# Patient Record
Sex: FEMALE | Race: WHITE
[De-identification: names, ages, dates, MRNs, and addresses within clinical notes are randomized per-mention and may not be internally consistent; named-entity substitution may affect disease eponyms.]

---

## 2019-07-24 ENCOUNTER — HOSPITAL ENCOUNTER (OUTPATIENT)
Dept: HOSPITAL 62 - RAD | Age: 19
End: 2019-07-24
Attending: MIDWIFE
Payer: SELF-PAY

## 2019-07-24 DIAGNOSIS — Z34.82: Primary | ICD-10-CM

## 2019-07-24 PROCEDURE — 76805 OB US >/= 14 WKS SNGL FETUS: CPT

## 2019-07-24 NOTE — RADIOLOGY REPORT (SQ)
EXAM DESCRIPTION:  U/S OB 14+ TRNABD 1GES W/O DOP



COMPLETED DATE/TIME:  7/24/2019 4:23 pm



REASON FOR STUDY:  Z34.82 ENCOUNTER FOR SUPRVSN OF NORMAL PREGNANCY, SECOND TRIMESTER Z34.82  ENCOUNT
ER FOR SUPRVSN OF NORMAL PREGNANCY, SECOND TRI



COMPARISON:  7/12/2019



TECHNIQUE:  Static and Dynamic grayscale imaging performed of gravid uterus using transabdominal appr
oach.  Additional selected color Doppler and spectral images recorded.  All stored on PACS.



LIMITATIONS:  None.



FINDINGS:  FETUSES SEEN:1

EGA: 19 weeks 2 days  Calculated using BPD,FL,HC,AC documented on images.

SUYAPA: 12/16/2019

EFW: 267 g

PERCENTILE: Not applicable. Fetus less than or equal to 20 weeks gestation.

LVP: 3.9 cm

PLACENTA: Anterior.

FETAL PRESENTATION: Breech.

FETAL ANATOMY:

FETAL HEART RATE: 157 beats per minute.

FOUR CHAMBER HEART: Visualized.

THREE VESSEL CORD: Yes.

CORD INSERTION: Visualized.

KIDNEYS AND BLADDER: Visualized. Appear normal.

STOMACH: Visualized. Appears normal.

SPINE: Normal as visualized.

BRAIN AND LATERAL VENTRICLES: Visualized. Appear normal.

OTHER: No other significant finding.

MATERNAL ADNEXA: Maternal ovaries not visualized.

CERVICAL LENGTH: 2.6 cm   Closed.

OTHER: No other significant finding.



IMPRESSION:  Living intrauterine pregnancy with estimated gestational age of 19 weeks and 2 days.

Trimester of pregnancy: Second trimester - 13 weeks 1 day to 27 weeks 6 days.



TECHNICAL DOCUMENTATION:  JOB ID:  [id number]

 2011 [employer]- All Rights Reserved



Reading location - IP/workstation name: [email or web address]

## 2020-09-22 ENCOUNTER — HOSPITAL ENCOUNTER (EMERGENCY)
Dept: HOSPITAL 62 - ER | Age: 20
Discharge: HOME | End: 2020-09-22
Payer: SELF-PAY

## 2020-09-22 VITALS — SYSTOLIC BLOOD PRESSURE: 114 MMHG | DIASTOLIC BLOOD PRESSURE: 62 MMHG

## 2020-09-22 DIAGNOSIS — R10.13: ICD-10-CM

## 2020-09-22 DIAGNOSIS — N73.0: Primary | ICD-10-CM

## 2020-09-22 DIAGNOSIS — R11.2: ICD-10-CM

## 2020-09-22 DIAGNOSIS — R10.11: ICD-10-CM

## 2020-09-22 DIAGNOSIS — K82.8: ICD-10-CM

## 2020-09-22 LAB
ADD MANUAL DIFF: NO
ALBUMIN SERPL-MCNC: 4.1 G/DL (ref 3.5–5)
ALP SERPL-CCNC: 50 U/L (ref 38–126)
ANION GAP SERPL CALC-SCNC: 9 MMOL/L (ref 5–19)
APPEARANCE UR: (no result)
APTT PPP: (no result) S
AST SERPL-CCNC: 28 U/L (ref 14–36)
BACTERIA (WET MOUNT): (no result)
BASOPHILS # BLD AUTO: 0.1 10^3/UL (ref 0–0.2)
BASOPHILS NFR BLD AUTO: 0.5 % (ref 0–2)
BILIRUB DIRECT SERPL-MCNC: 0.3 MG/DL (ref 0–0.4)
BILIRUB SERPL-MCNC: 1.2 MG/DL (ref 0.2–1.3)
BILIRUB UR QL STRIP: NEGATIVE
BUN SERPL-MCNC: 12 MG/DL (ref 7–20)
CALCIUM: 9.6 MG/DL (ref 8.4–10.2)
CHLAM PCR: DETECTED
CHLORIDE SERPL-SCNC: 107 MMOL/L (ref 98–107)
CO2 SERPL-SCNC: 24 MMOL/L (ref 22–30)
EOSINOPHIL # BLD AUTO: 0.3 10^3/UL (ref 0–0.6)
EOSINOPHIL NFR BLD AUTO: 1.7 % (ref 0–6)
ERYTHROCYTE [DISTWIDTH] IN BLOOD BY AUTOMATED COUNT: 13.6 % (ref 11.5–14)
GLUCOSE SERPL-MCNC: 91 MG/DL (ref 75–110)
GLUCOSE UR STRIP-MCNC: NEGATIVE MG/DL
HCT VFR BLD CALC: 37.8 % (ref 36–47)
HGB BLD-MCNC: 13.4 G/DL (ref 12–15.5)
KETONES UR STRIP-MCNC: 80 MG/DL
LYMPHOCYTES # BLD AUTO: 1.9 10^3/UL (ref 0.5–4.7)
LYMPHOCYTES NFR BLD AUTO: 11 % (ref 13–45)
MCH RBC QN AUTO: 31.2 PG (ref 27–33.4)
MCHC RBC AUTO-ENTMCNC: 35.4 G/DL (ref 32–36)
MCV RBC AUTO: 88 FL (ref 80–97)
MONOCYTES # BLD AUTO: 1.4 10^3/UL (ref 0.1–1.4)
MONOCYTES NFR BLD AUTO: 8.2 % (ref 3–13)
NEUTROPHILS # BLD AUTO: 13.7 10^3/UL (ref 1.7–8.2)
NEUTS SEG NFR BLD AUTO: 78.6 % (ref 42–78)
NITRITE UR QL STRIP: NEGATIVE
PH UR STRIP: 5 [PH] (ref 5–9)
PLATELET # BLD: 306 10^3/UL (ref 150–450)
POTASSIUM SERPL-SCNC: 3.8 MMOL/L (ref 3.6–5)
PROT SERPL-MCNC: 7.6 G/DL (ref 6.3–8.2)
PROT UR STRIP-MCNC: 30 MG/DL
RBC # BLD AUTO: 4.28 10^6/UL (ref 3.72–5.28)
SP GR UR STRIP: 1.03
T.VAGINALIS (WET MOUNT): (no result)
TOTAL CELLS COUNTED % (AUTO): 100 %
UROBILINOGEN UR-MCNC: 2 MG/DL (ref ?–2)
WBC # BLD AUTO: 17.5 10^3/UL (ref 4–10.5)
WBCS (WET MOUNT): (no result)
YEAST (WET MOUNT): (no result)

## 2020-09-22 PROCEDURE — 74177 CT ABD & PELVIS W/CONTRAST: CPT

## 2020-09-22 PROCEDURE — 81001 URINALYSIS AUTO W/SCOPE: CPT

## 2020-09-22 PROCEDURE — 36415 COLL VENOUS BLD VENIPUNCTURE: CPT

## 2020-09-22 PROCEDURE — 96365 THER/PROPH/DIAG IV INF INIT: CPT

## 2020-09-22 PROCEDURE — 84702 CHORIONIC GONADOTROPIN TEST: CPT

## 2020-09-22 PROCEDURE — 85025 COMPLETE CBC W/AUTO DIFF WBC: CPT

## 2020-09-22 PROCEDURE — 96375 TX/PRO/DX INJ NEW DRUG ADDON: CPT

## 2020-09-22 PROCEDURE — 99285 EMERGENCY DEPT VISIT HI MDM: CPT

## 2020-09-22 PROCEDURE — 83690 ASSAY OF LIPASE: CPT

## 2020-09-22 PROCEDURE — 87591 N.GONORRHOEAE DNA AMP PROB: CPT

## 2020-09-22 PROCEDURE — 76705 ECHO EXAM OF ABDOMEN: CPT

## 2020-09-22 PROCEDURE — 87491 CHLMYD TRACH DNA AMP PROBE: CPT

## 2020-09-22 PROCEDURE — 80053 COMPREHEN METABOLIC PANEL: CPT

## 2020-09-22 PROCEDURE — 96361 HYDRATE IV INFUSION ADD-ON: CPT

## 2020-09-22 PROCEDURE — 87210 SMEAR WET MOUNT SALINE/INK: CPT

## 2020-09-22 NOTE — RADIOLOGY REPORT (SQ)
EXAM DESCRIPTION:  U/S ABDOMEN LIMITED W/O DOP



IMAGES COMPLETED DATE/TIME:  9/22/2020 9:57 am



REASON FOR STUDY:  RUQ abd pain, N V



COMPARISON:  None.



TECHNIQUE:  Dynamic and static grayscale images acquired of the abdomen and recorded on PACS. Additio
nal selected color Doppler and spectral images recorded.



LIMITATIONS:  None.



FINDINGS:  PANCREAS: No masses.  Visualized pancreatic duct normal caliber.

LIVER: No masses. Echotexture normal.

LIVER VASCULATURE: Normal directional flow of the main portal vein and hepatic veins.

GALLBLADDER: Sludge without stones. Normal wall thickness. No significant pericholecystic fluid.

ULTRASOUND-DETECTED PLEITEZ'S SIGN: Negative.

INTRAHEPATIC DUCTS AND COMMON DUCT: CBD and intrahepatic ducts normal caliber. No filling defects.

INFERIOR VENA CAVA: Normal flow.

AORTA: No aneurysm.

RIGHT KIDNEY:  Normal size. Normal echogenicity. No solid or suspicious masses. No hydronephrosis. No
 calcifications.

PERITONEAL AND RIGHT PLEURAL SPACE: No ascites or effusions.

OTHER: No other significant findings.



IMPRESSION:  In the absence of mural thickening, sonographic Pleitez's sign, or cholelithiasis, a trac
e amount of fluid seen adjacent to the gallbladder is of doubtful clinical significance.  Essentially
 normal right upper quadrant ultrasound.



TECHNICAL DOCUMENTATION:  JOB ID:  [id number]

 2011 [employer]- All Rights Reserved



Reading location - IP/workstation name: [email or web address]

## 2020-09-22 NOTE — RADIOLOGY REPORT (SQ)
EXAM DESCRIPTION:  CT ABD/PELVIS WITH IV   ORAL



IMAGES COMPLETED DATE/TIME:  9/22/2020 1:54 pm



REASON FOR STUDY:  RUQ abd pain, leukocytosis



COMPARISON:  None.



TECHNIQUE:  CT scan of the abdomen and pelvis performed using helical scanning technique with dynamic
 intravenous contrast injection.  Oral contrast. Images reviewed with lung, soft tissue, and bone win
dows. Reconstructed coronal and sagittal MPR images reviewed. Delayed images for evaluation of the ur
inary system also acquired. All images stored on PACS.

All CT scanners at this facility use dose modulation, iterative reconstruction, and/or weight based d
osing when appropriate to reduce radiation dose to as low as reasonably achievable (ALARA).

[facility]: Dose Right  [facility]: CareDose    [facility]: Dose Right    [facility]: Teradose 4D    [facility]: Smart Technologies



CONTRAST TYPE AND DOSE:  contrast/concentration: Isovue 350.00 mmol/ml; Total Contrast Delivered: 78.
0 ml; Total Saline Delivered: 50.5 ml



RENAL FUNCTION:  BUN 12 creatinine 0.65



RADIATION DOSE:  CT Rad equipment meets quality standard of care and radiation dose reduction techniq
ues were employed. CTDIvol: 5.8 - 8.0 mGy. DLP: 744 mGy-cm..



LIMITATIONS:  None.



FINDINGS:  LOWER CHEST: There is a pectus deformity.  No acute pulmonary infiltrates or nodules.

LIVER: Normal size. No masses.  No dilated ducts.

SPLEEN: Normal size. No focal lesions.

PANCREAS: No masses. No significant calcifications. No adjacent inflammation or peripancreatic fluid 
collections. Pancreatic duct not dilated.

GALLBLADDER: No identified stones by CT criteria. No inflammatory changes to suggest cholecystitis.

ADRENAL GLANDS: No significant masses or asymmetry.

RIGHT KIDNEY AND URETER: No solid masses.   No significant calcifications.   No hydronephrosis or hyd
roureter.

LEFT KIDNEY AND URETER: No solid masses.   No significant calcifications.   No hydronephrosis or hydr
oureter.

AORTA AND VESSELS: No aneurysm. No dissection. Renal arteries, SMA, celiac without stenosis.

RETROPERITONEUM: No retroperitoneal adenopathy, hemorrhage or masses.

BOWEL AND PERITONEAL CAVITY: No masses or inflammatory changes. No free fluid or peritoneal masses.

APPENDIX: Not identified.

PELVIS: No mass.  No free fluid. Normal bladder.

ABDOMINAL WALL: No masses. No hernias.

BONES: No significant or acute findings.

OTHER: No other significant finding.



IMPRESSION:  Pectus deformity in the chest.  No acute findings in the abdomen or pelvis.



TECHNICAL DOCUMENTATION:  JOB ID:  [id number]

Quality ID # [id number]: Final reports with documentation of one or more dose reduction techniques (e.g., Au
tomated exposure control, adjustment of the mA and/or kV according to patient size, use of iterative 
reconstruction technique)

 2011 [employer]- All Rights Reserved



Reading location - IP/workstation name: [email or web address]

## 2020-09-22 NOTE — ER DOCUMENT REPORT
Entered by ROYAL MARIN SCRIBE  09/22/20 0740 





Acting as scribe for:MARSHAL BELLA MD





ED GI/





- General


Chief Complaint: Abdominal Pain


Stated Complaint: ABDOMINAL PAIN/BACK PAIN/VOMITING


Time Seen by Provider: 09/22/20 07:29


Primary Care Provider: 


JUN STAPLETON CNM [NO LOCAL MD] - Follow up as needed


Mode of Arrival: Ambulatory


Information source: Patient


Notes: 





This 19 year old female patient presents to the emergency department today with 

complaints of a three day history of bilateral flank pain, abdominal pain, 

nausea, and one episode of vomiting. She reports that during a previous 

pregnancy she was "told her gallbladder needed to come out" but it has not been 

removed or evaluated since. Patient reports her pain today is similar to the 

pain she was having during pregnancy. Her LMP was a month ago, she has Nexplanon

implanted, and she took a pregnancy test before coming in which was negative. 

She mentions that yesterday she noticed that her upper abdomen was swollen and 

her pain is exacerbated with eating/drinking.





TRAVEL OUTSIDE OF THE [place] IN LAST 30 DAYS: No





- Related Data


Allergies/Adverse Reactions: 


                                        





No Known Allergies Allergy (Verified 09/22/20 07:05)


   











Past Medical History





- Social History


Smoking Status: Never Smoker


Frequency of alcohol use: None


Drug Abuse: None


Family History: Reviewed & Not Pertinent


Renal/ Medical History: Denies: Hx Peritoneal Dialysis





- Immunizations


Hx Diphtheria, Pertussis, Tetanus Vaccination: Yes





Review of Systems





- Review of Systems


Constitutional: No symptoms reported


EENT: No symptoms reported


Cardiovascular: No symptoms reported


Respiratory: No symptoms reported


Gastrointestinal: See HPI, Abdominal pain, Nausea, Vomiting


Genitourinary: No symptoms reported


Female Genitourinary: Last menstrual period - a month ago.  denies: Pregnant


Musculoskeletal: See HPI, Back pain


Skin: No symptoms reported


Hematologic/Lymphatic: No symptoms reported


Neurological/Psychological: No symptoms reported


-: Yes All other systems reviewed and negative





Physical Exam





- Vital signs


Vitals: 


                                        











Temp Pulse Resp BP Pulse Ox


 


 97.8 F   82   16   132/64 H  100 


 


 09/22/20 06:54  09/22/20 06:54  09/22/20 06:54  09/22/20 06:54  09/22/20 06:54














- Notes


Notes: 





Physical Exam:


 


General: Alert, appears uncomfortable. 


 


HEENT: Normocephalic. Atraumatic. PERRL. Extraocular movements intact. 

Oropharynx clear.


 


Neck: Supple. Non-tender.


 


Respiratory: No respiratory distress. Clear and equal breath sounds bilaterally.


 


Cardiovascular: Regular rate and rhythm. 


 


Abdominal: Epigastric and exquisite RUQ tenderness with palpation. No 

distension. Normal Bowel Sounds. 


 


Back: No gross abnormalities. 


 


Extremities: Moves all four extremities.


Upper extremities: Normal inspection. Normal ROM.  


Lower extremities: Normal inspection. No edema. Normal ROM.


 


Neurological: Normal cognition. AAOx4. Normal speech.  


 


Psychological: Normal affect. Normal Mood. 


 


Skin: Warm. Dry. Normal color.





- Genitourinary


External exam: Normal


Speculum exam: Cervix closed, Vaginal discharge


Vaginal bleeding: None


Bimanuel exam: Cervical motion tender, Adnexal tenderness





Course





- Re-evaluation


Re-evalutation: 





09/22/20 09:51


At this point, patient still has epigastric pain on palpation, which she states 

that it was better after the GI cocktail.  She is still quite tender in the 

right upper quadrant.


09/22/20 10:53


Right upper quadrant abdominal ultrasound shows gallbladder sludge without 

stones.  No gallbladder wall thickening.  Trace pericholecystic negative 

sonographic Pleitez's.


Due to the essentially negative gallbladder ultrasound, will do a contrasted CT 

scan of the abdomen pelvis.


09/22/20 15:06


After the CT scan was completed the patient was reevaluated.  At this time she 

is actually most tender in her right lower quadrant and some in the left lower 

quadrant.  She is much less tender in the right upper quadrant.  Previously she 

was minimally tender in the lower abdomen and pelvis region on exam and most 

tender in the right upper quadrant and epigastrium.  The skin in her abdomen 

feels very warm at this time like she may be running a fever.


I asked the nurse to set the patient up for pelvic exam.





- Vital Signs


Vital signs: 


                                        











Temp Pulse Resp BP Pulse Ox


 


 98.2 F   70   16   118/62   100 


 


 09/22/20 11:30  09/22/20 11:30  09/22/20 11:30  09/22/20 11:30  09/22/20 11:30














- Laboratory


Result Diagrams: 


                                 09/22/20 07:40





                                 09/22/20 07:40


Laboratory results interpreted by me: 


                                        











  09/22/20 09/22/20 09/22/20





  07:40 07:40 11:10


 


WBC  17.5 H  


 


Lymph % (Auto)  11.0 L  


 


Absolute Neuts (auto)  13.7 H  


 


Seg Neutrophils %  78.6 H  


 


Lipase   < 10.0 L 


 


Urine Protein    30 H


 


Urine Ketones    80 H


 


Urine Urobilinogen    2.0 H


 


Ur Leukocyte Esterase    SMALL H














- Diagnostic Test


Radiology reviewed: Image reviewed, Reports reviewed - Right upper quadrant 

abdomen ultrasound shows gallbladder sludge, minimal pericholecystic fluid, 

negative Pleitez sign.  No gallbladder wall thickening.  Contrasted CT scan of 

the abdomen pelvis was unremarkable.





Discharge





- Discharge


Clinical Impression: 


 PID (acute pelvic inflammatory disease), Sludge in gallbladder





Condition: Stable


Disposition: HOME, SELF-CARE


Additional Instructions: 


Pelvic Inflammatory Disease:





     You have been diagnosed as having pelvic inflammatory disease (PID).  This 

is an infection of the fallopian tubes and surrounding areas of the pelvis.  

Symptoms are usually pelvic pain and discharge.  The infection can do permanent 

damage to the tubes and ovaries.  It should be taken very seriously.


     Treatment is antibiotics, which may be given by vein or by injection if the

infection seems serious.  It's important that you receive all recommended 

medication. Condoms help prevent spread of this infection to others.


     Because this infection is spread sexually, it's important that your sexual 

partner be checked before resuming sexual relations.  If a culture shows 

gonorrhea or chlamydia organisms, the law requires that this be reported to the 

health department.


     Call the doctor or return at once if you develop increasing fever, rash, 

severe pelvic pain, vaginal bleeding (other than your period), or problems with 

your bladder or bowels.








***********************************

********************************************************************************


******





Take the medications as prescribed.


Take the Norco tablets as dispensed today for pain control.


Take ibuprofen 600 mg every 8 hours for the next few days.


Drink plenty of fluids and get plenty of rest.


Follow-up with [facility] if not improving.





RETURN TO THE EMERGENCY ROOM IF ANY NEW OR WORSENING SYMPTOMS.








Prescriptions: 


Doxycycline Hyclate 100 mg PO BID #20 tablet.


Referrals: 


JUN STAPLETON CNM [NO LOCAL MD] - Follow up as needed


[facility] [Provider Group] - Follow up as needed





I personally performed the services described in the documentation, reviewed and

edited the documentation which was dictated to the scribe in my presence, and it

accurately records my words and actions.